# Patient Record
Sex: FEMALE
[De-identification: names, ages, dates, MRNs, and addresses within clinical notes are randomized per-mention and may not be internally consistent; named-entity substitution may affect disease eponyms.]

---

## 2020-11-12 ENCOUNTER — APPOINTMENT (OUTPATIENT)
Dept: ULTRASOUND IMAGING | Facility: HOSPITAL | Age: 50
End: 2020-11-12

## 2020-11-16 PROBLEM — Z00.00 ENCOUNTER FOR PREVENTIVE HEALTH EXAMINATION: Status: ACTIVE | Noted: 2020-11-16

## 2020-11-16 NOTE — END OF VISIT
[FreeTextEntry3] : Written by Thuy Reyes PA-C, acting as a scribe for Dr. Andres Molina.\par  [FreeTextEntry2] : This note accurately reflects the work and decisions made by me.\par

## 2020-11-16 NOTE — LETTER BODY
[FreeTextEntry2] : Name: EDUARDO MARTINEZ \par : 1970 \par \par Date of Visit: 2020 \par \par Dear Dr. Martin\par \par I recently saw our mutual patient, EDUARDO MARTINEZ , in my office.\par \par Below, please see my findings.\par \par As always, it is my sincere pleasure to have the opportunity to participate in one of your patients' care. Please feel free to contact me with any questions or concerns that you may have regarding her care.\par \par Sincerely yours,\par \par Andres Molina MD, FACOG, FACS\par

## 2020-11-16 NOTE — CHIEF COMPLAINT
[FreeTextEntry1] : [employer]\par \par [facility] [phone] at [address] [place] [id number]\par

## 2020-11-16 NOTE — PHYSICAL EXAM
[Normal] : Anus and perineum: Normal sphincter tone, no masses, no prolapse. [de-identified] : Patient was interviewed and examined with chaperone present. Name of Chaperone: Judie Reyes PA-C

## 2020-11-16 NOTE — HISTORY OF PRESENT ILLNESS
[FreeTextEntry1] : 49yo G P LMP\par \par LPAP-\par LMammo-\par LColonoscopy-\par LBone Density Scan-\par

## 2020-11-18 ENCOUNTER — APPOINTMENT (OUTPATIENT)
Dept: GYNECOLOGIC ONCOLOGY | Facility: CLINIC | Age: 50
End: 2020-11-18